# Patient Record
Sex: FEMALE | Race: WHITE | NOT HISPANIC OR LATINO | URBAN - METROPOLITAN AREA
[De-identification: names, ages, dates, MRNs, and addresses within clinical notes are randomized per-mention and may not be internally consistent; named-entity substitution may affect disease eponyms.]

---

## 2020-07-06 ENCOUNTER — OFFICE (OUTPATIENT)
Dept: URBAN - METROPOLITAN AREA CLINIC 102 | Facility: CLINIC | Age: 75
End: 2020-07-06

## 2020-07-06 VITALS
SYSTOLIC BLOOD PRESSURE: 147 MMHG | WEIGHT: 125 LBS | HEIGHT: 64 IN | DIASTOLIC BLOOD PRESSURE: 70 MMHG | HEART RATE: 60 BPM

## 2020-07-06 DIAGNOSIS — K90.89 OTHER INTESTINAL MALABSORPTION: ICD-10-CM

## 2020-07-06 DIAGNOSIS — Z12.11 ENCOUNTER FOR SCREENING FOR MALIGNANT NEOPLASM OF COLON: ICD-10-CM

## 2020-07-06 PROCEDURE — 99204 OFFICE O/P NEW MOD 45 MIN: CPT | Mod: 95 | Performed by: INTERNAL MEDICINE

## 2020-07-06 PROCEDURE — 00031: CPT | Performed by: INTERNAL MEDICINE

## 2020-07-06 RX ORDER — COLESTIPOL HYDROCHLORIDE 1 G/1
TABLET, FILM COATED ORAL
Qty: 90 | Refills: 0 | Status: COMPLETED
Start: 2020-07-06 | End: 2021-11-30

## 2020-07-28 ENCOUNTER — ON CAMPUS - OUTPATIENT (OUTPATIENT)
Dept: URBAN - METROPOLITAN AREA HOSPITAL 37 | Facility: HOSPITAL | Age: 75
End: 2020-07-28
Payer: COMMERCIAL

## 2020-07-28 DIAGNOSIS — R19.4 CHANGE IN BOWEL HABIT: ICD-10-CM

## 2020-07-28 PROCEDURE — 45380 COLONOSCOPY AND BIOPSY: CPT | Performed by: INTERNAL MEDICINE

## 2021-11-30 ENCOUNTER — OFFICE (OUTPATIENT)
Dept: URBAN - METROPOLITAN AREA CLINIC 102 | Facility: CLINIC | Age: 76
End: 2021-11-30

## 2021-11-30 VITALS
HEART RATE: 70 BPM | SYSTOLIC BLOOD PRESSURE: 126 MMHG | WEIGHT: 121 LBS | TEMPERATURE: 97.7 F | DIASTOLIC BLOOD PRESSURE: 101 MMHG | HEIGHT: 64 IN

## 2021-11-30 DIAGNOSIS — R79.89 OTHER SPECIFIED ABNORMAL FINDINGS OF BLOOD CHEMISTRY: ICD-10-CM

## 2021-11-30 DIAGNOSIS — R10.13 EPIGASTRIC PAIN: ICD-10-CM

## 2021-11-30 LAB
AMBIG ABBREV HFP7 DEFAULT: (no result)
ANTI-MITOCHONDRIAL AB BY IFA: (no result)
HBSAG SCREEN: NEGATIVE
HCV ANTIBODY: HEP C VIRUS AB: <0.1 S/CO RATIO
HEP B CORE AB, TOT: NEGATIVE
HEPATIC FUNCTION PANEL (7): ALBUMIN: 4.5 G/DL (ref 3.7–4.7)
HEPATIC FUNCTION PANEL (7): ALKALINE PHOSPHATASE: 180 IU/L — HIGH (ref 44–121)
HEPATIC FUNCTION PANEL (7): ALT (SGPT): 53 IU/L — HIGH (ref 0–32)
HEPATIC FUNCTION PANEL (7): AST (SGOT): 34 IU/L (ref 0–40)
HEPATIC FUNCTION PANEL (7): BILIRUBIN, DIRECT: 0.14 MG/DL (ref 0–0.4)
HEPATIC FUNCTION PANEL (7): BILIRUBIN, TOTAL: 0.5 MG/DL (ref 0–1.2)
HEPATIC FUNCTION PANEL (7): PROTEIN, TOTAL: 7.1 G/DL (ref 6–8.5)
HEPATITIS B SURF AB QUANT: 3.2 MIU/ML — LOW (ref 9.9–?)
MITOCHONDRIAL (M2) ANTIBODY: <20 UNITS

## 2021-11-30 PROCEDURE — 99214 OFFICE O/P EST MOD 30 MIN: CPT | Performed by: PHYSICIAN ASSISTANT

## 2021-11-30 RX ORDER — FAMOTIDINE 40 MG/1
TABLET, FILM COATED ORAL
Qty: 60 | Refills: 6 | Status: COMPLETED
Start: 2021-11-30 | End: 2022-08-15

## 2021-11-30 NOTE — SERVICEHPINOTES
IRMA CHRISTIANSEN   is a   75 yo white female who is here for f/u to [facility] ED visit on 11/27/2021. She recalls acute onset of epigastric pain that lasted 10-15 minutes with asso. nausea, which took place while traveling with daughter in passenger seat. She notes similar events "at most 3-4 in a year" of this "random" stomach pain at home that resolves when laying flat on her belly. She informs of eating overall healthy with no more than 1/2 a can of soda every few days. She tries to eat dinner before 6 pm No late meals. Last EGD in 2012 normal. Rare NSAID use. Reviewed ED records noting abdominal u/s negative h/o CCY. Her labs noted no anemia (hgb 13/hct 42), WBC at 8.5 nl, and CMP with mild elevation of LFTs given , ,  alk phos 227, T bili 0.60 nl, lipase 153 nl. Denies chest pain, vomiting, dysphagia, recurrent abdominal pain, melena, weight loss. No other complaints. 
br
br
brfont style="background-color: rgb(255, 255, 255)" visited="true"Last 07/2020 colonoscopy benign findings bx joseph colitis or malignancy so advised no further colonoscopy/fontbr
br

## 2022-08-15 ENCOUNTER — OFFICE (OUTPATIENT)
Dept: URBAN - METROPOLITAN AREA CLINIC 102 | Facility: CLINIC | Age: 77
End: 2022-08-15

## 2022-08-15 VITALS
HEART RATE: 62 BPM | DIASTOLIC BLOOD PRESSURE: 87 MMHG | WEIGHT: 118 LBS | TEMPERATURE: 97.5 F | HEIGHT: 64 IN | SYSTOLIC BLOOD PRESSURE: 165 MMHG

## 2022-08-15 DIAGNOSIS — K59.1 FUNCTIONAL DIARRHEA: ICD-10-CM

## 2022-08-15 PROCEDURE — 99214 OFFICE O/P EST MOD 30 MIN: CPT | Performed by: INTERNAL MEDICINE

## 2023-08-28 ENCOUNTER — OFFICE (OUTPATIENT)
Dept: URBAN - METROPOLITAN AREA CLINIC 34 | Facility: CLINIC | Age: 78
End: 2023-08-28

## 2023-08-28 VITALS
HEART RATE: 70 BPM | HEIGHT: 64 IN | TEMPERATURE: 97.5 F | SYSTOLIC BLOOD PRESSURE: 179 MMHG | DIASTOLIC BLOOD PRESSURE: 86 MMHG | WEIGHT: 113 LBS

## 2023-08-28 DIAGNOSIS — K59.1 FUNCTIONAL DIARRHEA: ICD-10-CM

## 2023-08-28 PROCEDURE — 99214 OFFICE O/P EST MOD 30 MIN: CPT | Performed by: INTERNAL MEDICINE

## 2023-08-28 RX ORDER — CHOLESTYRAMINE
POWDER (GRAM) MISCELLANEOUS
Qty: 1 | Refills: 5 | Status: ACTIVE
Start: 2023-08-28